# Patient Record
(demographics unavailable — no encounter records)

---

## 2024-12-26 NOTE — HISTORY OF PRESENT ILLNESS
[Never] : never [TextBox_4] : 24 female hx asthma today feels good occ dry cough  no  no chest pain no tightness full activity  precipitating factors  +dog and cat and owns dog and cats  remains on spriva 2 qd use levalbuterol prn  approx  once per week not related to animals and ++cold

## 2024-12-26 NOTE — DISCUSSION/SUMMARY
[FreeTextEntry1] : Ms. Fraire has mild to moderate asthma.  She is well-controlled on the Spiriva using albuterol once per week.  She clearly has atopic asthma as she is now developed eczema.  She is allergic cats and dogs and has daily exposure to both.  This will certainly precipitate asthma or exacerbations and may control difficult.  She cannot take long-acting beta agonist because of tachycardia.  I think we will defer inhaled corticosteroids at this time continue Spiriva and follow-up in 6 months.  Will perform pulmonary function test on her next visit. The patient understands and agrees with plan of care. Today's office visit encompassed 32 minutes which excludes teaching and separately reported services.. I conducted an extensive history, physical exam and reviewed diagnosis and treatment options including diagnostic tests,radiology studies including cat scans and the use of prescription medication.

## 2024-12-26 NOTE — REASON FOR VISIT
[Follow-Up] : a follow-up visit [Asthma] : asthma [TextEntry] : patient presents for a 1 month follow up  asthma